# Patient Record
Sex: FEMALE | Race: WHITE | NOT HISPANIC OR LATINO | URBAN - METROPOLITAN AREA
[De-identification: names, ages, dates, MRNs, and addresses within clinical notes are randomized per-mention and may not be internally consistent; named-entity substitution may affect disease eponyms.]

---

## 2022-06-03 ENCOUNTER — WEB ENCOUNTER (OUTPATIENT)
Dept: URBAN - METROPOLITAN AREA CLINIC 113 | Facility: CLINIC | Age: 32
End: 2022-06-03

## 2022-06-03 ENCOUNTER — OFFICE VISIT (OUTPATIENT)
Dept: URBAN - METROPOLITAN AREA CLINIC 113 | Facility: CLINIC | Age: 32
End: 2022-06-03
Payer: COMMERCIAL

## 2022-06-03 ENCOUNTER — LAB OUTSIDE AN ENCOUNTER (OUTPATIENT)
Dept: URBAN - METROPOLITAN AREA CLINIC 113 | Facility: CLINIC | Age: 32
End: 2022-06-03

## 2022-06-03 VITALS
BODY MASS INDEX: 45.82 KG/M2 | HEART RATE: 81 BPM | DIASTOLIC BLOOD PRESSURE: 82 MMHG | HEIGHT: 65 IN | SYSTOLIC BLOOD PRESSURE: 123 MMHG | TEMPERATURE: 97.7 F | RESPIRATION RATE: 20 BRPM | WEIGHT: 275 LBS

## 2022-06-03 DIAGNOSIS — R19.7 DIARRHEA, UNSPECIFIED TYPE: ICD-10-CM

## 2022-06-03 DIAGNOSIS — R13.19 ESOPHAGEAL DYSPHAGIA: ICD-10-CM

## 2022-06-03 DIAGNOSIS — R10.84 GENERALIZED ABDOMINAL PAIN: ICD-10-CM

## 2022-06-03 DIAGNOSIS — K59.09 CHRONIC CONSTIPATION: ICD-10-CM

## 2022-06-03 DIAGNOSIS — R10.11 RIGHT UPPER QUADRANT ABDOMINAL PAIN: ICD-10-CM

## 2022-06-03 PROCEDURE — 99204 OFFICE O/P NEW MOD 45 MIN: CPT | Performed by: NURSE PRACTITIONER

## 2022-06-03 PROCEDURE — 99204 OFFICE O/P NEW MOD 45 MIN: CPT | Performed by: INTERNAL MEDICINE

## 2022-06-03 RX ORDER — DICYCLOMINE HYDROCHLORIDE 10 MG/1
1 TABLET CAPSULE ORAL
Qty: 60 | Refills: 3 | OUTPATIENT
Start: 2022-06-03 | End: 2022-10-01

## 2022-06-03 RX ORDER — SERTRALINE 50 MG/1
1 TABLET TABLET, FILM COATED ORAL ONCE A DAY
Status: ACTIVE | COMMUNITY

## 2022-06-03 RX ORDER — PANTOPRAZOLE SODIUM 40 MG/1
1 TABLET TABLET, DELAYED RELEASE ORAL ONCE A DAY
Qty: 30 | Refills: 5 | OUTPATIENT
Start: 2022-06-03

## 2022-06-03 RX ORDER — FLUOXETINE HYDROCHLORIDE 20 MG/1
1 TABLET TABLET ORAL ONCE A DAY
Status: ACTIVE | COMMUNITY

## 2022-06-03 NOTE — HPI-TODAY'S VISIT:
This is a 31-year-old female with a history of endometriosis, hypertension, anxiety, and a herniated disc presenting for evaluation of abdominal pain.   She reports onset of pain 6 months ago.  She has postprandial sharp, stabbing right upper quadrant pain that lasts 2 minutes.  She has intermittent abdominal pain in variable locations in her abdomen occurring at random feeling like "a kick in the stomach, sharp shooting pain, aggravating pain, and stabbing pain."  Occasionally, this is exacerbated by constipation and will persist for 1 to 2 days after a bowel movement.   At baseline, she has chronic constipation reporting 2 or 3 days without a bowel movement.  Stool softeners are ineffective and she has diarrhea associated with a single dose of MiraLAX or milk of magnesia.  She denies red blood per rectum or melena.  For the last 4 or 5 days, she has experienced diarrhea reporting 5-10 unformed or watery bowel movements per day.   She admits being treated as an inpatient and outpatient for a kidney infection a month ago.  She is required 4 different antibiot ics and was hospitalized for 3 days due to positive blood cultures requiring IV antibiotics.  She is scheduled for urological evaluation in August.  She has not had stool studies. She has a history of acid reflux associated with ingesting spicy or fried food.  It is relieved with milk which she infrequently needs as she manages symptoms with dietary modification.  She took pantoprazole in the past which was helpful.  She has dysphagia 2 or 3 times a week describing solid food lodging in the upper portion of her esophagus relieved with time or liquid.  She reports nausea for the last 5 days.  She vomited the first day only.  She denies weight loss. She states her primary care physician has not ordered tests or prescribed medication.  She has been to the emergency department at [facility] 4 times in the last 3 months and has been seen by urgent care 6 times in the last 3 months.  She was last seen in the emergency department 2 weeks ago.  Stool softeners and MiraLAX have been recommended and she has not been prescribed medication.  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

## 2022-07-12 ENCOUNTER — OFFICE VISIT (OUTPATIENT)
Dept: URBAN - METROPOLITAN AREA MEDICAL CENTER 19 | Facility: MEDICAL CENTER | Age: 32
End: 2022-07-12
Payer: COMMERCIAL

## 2022-07-12 DIAGNOSIS — K20.80 ESOPHAGITIS DISSECANS SUPERFICIALIS: ICD-10-CM

## 2022-07-12 DIAGNOSIS — R10.13 ABDOMINAL DISCOMFORT, EPIGASTRIC: ICD-10-CM

## 2022-07-12 DIAGNOSIS — K29.60 ADENOPAPILLOMATOSIS GASTRICA: ICD-10-CM

## 2022-07-12 DIAGNOSIS — R13.19 CERVICAL DYSPHAGIA: ICD-10-CM

## 2022-07-12 PROCEDURE — 43239 EGD BIOPSY SINGLE/MULTIPLE: CPT | Performed by: INTERNAL MEDICINE

## 2022-07-12 RX ORDER — DICYCLOMINE HYDROCHLORIDE 10 MG/1
1 TABLET CAPSULE ORAL
Qty: 60 | Refills: 3 | Status: ACTIVE | COMMUNITY
Start: 2022-06-03 | End: 2022-10-01

## 2022-07-12 RX ORDER — PANTOPRAZOLE SODIUM 40 MG/1
1 TABLET TABLET, DELAYED RELEASE ORAL ONCE A DAY
Qty: 30 | Refills: 5 | Status: ACTIVE | COMMUNITY
Start: 2022-06-03

## 2022-07-12 RX ORDER — SERTRALINE 50 MG/1
1 TABLET TABLET, FILM COATED ORAL ONCE A DAY
Status: ACTIVE | COMMUNITY

## 2022-07-12 RX ORDER — FLUOXETINE HYDROCHLORIDE 20 MG/1
1 TABLET TABLET ORAL ONCE A DAY
Status: ACTIVE | COMMUNITY

## 2022-08-24 ENCOUNTER — OFFICE VISIT (OUTPATIENT)
Dept: URBAN - METROPOLITAN AREA CLINIC 107 | Facility: CLINIC | Age: 32
End: 2022-08-24
Payer: COMMERCIAL

## 2022-08-24 ENCOUNTER — DASHBOARD ENCOUNTERS (OUTPATIENT)
Age: 32
End: 2022-08-24

## 2022-08-24 VITALS
BODY MASS INDEX: 46.48 KG/M2 | HEART RATE: 79 BPM | SYSTOLIC BLOOD PRESSURE: 114 MMHG | WEIGHT: 279 LBS | TEMPERATURE: 98.2 F | DIASTOLIC BLOOD PRESSURE: 80 MMHG | HEIGHT: 65 IN

## 2022-08-24 DIAGNOSIS — K59.09 CHRONIC CONSTIPATION: ICD-10-CM

## 2022-08-24 DIAGNOSIS — R10.84 GENERALIZED ABDOMINAL PAIN: ICD-10-CM

## 2022-08-24 DIAGNOSIS — R13.19 ESOPHAGEAL DYSPHAGIA: ICD-10-CM

## 2022-08-24 DIAGNOSIS — R10.11 RIGHT UPPER QUADRANT ABDOMINAL PAIN: ICD-10-CM

## 2022-08-24 DIAGNOSIS — R19.7 DIARRHEA, UNSPECIFIED TYPE: ICD-10-CM

## 2022-08-24 PROBLEM — 62315008: Status: ACTIVE | Noted: 2022-06-03

## 2022-08-24 PROBLEM — 102614006: Status: ACTIVE | Noted: 2022-06-03

## 2022-08-24 PROBLEM — 301717006: Status: ACTIVE | Noted: 2022-06-03

## 2022-08-24 PROBLEM — 236069009: Status: ACTIVE | Noted: 2022-06-04

## 2022-08-24 PROBLEM — 40890009 ESOPHAGEAL DYSPHAGIA: Status: ACTIVE | Noted: 2022-06-03

## 2022-08-24 PROCEDURE — 99213 OFFICE O/P EST LOW 20 MIN: CPT | Performed by: INTERNAL MEDICINE

## 2022-08-24 RX ORDER — LOSARTAN POTASSIUM 25 MG/1
1 TABLET TABLET ORAL ONCE A DAY
Status: ACTIVE | COMMUNITY

## 2022-08-24 RX ORDER — FLUOXETINE HYDROCHLORIDE 20 MG/1
1 TABLET TABLET ORAL ONCE A DAY
Status: ACTIVE | COMMUNITY

## 2022-08-24 RX ORDER — PANTOPRAZOLE SODIUM 40 MG/1
1 TABLET TABLET, DELAYED RELEASE ORAL ONCE A DAY
Qty: 30 | Refills: 5 | Status: ACTIVE | COMMUNITY
Start: 2022-06-03

## 2022-08-24 RX ORDER — SERTRALINE 50 MG/1
1 TABLET TABLET, FILM COATED ORAL ONCE A DAY
Status: ACTIVE | COMMUNITY

## 2022-08-24 RX ORDER — DICYCLOMINE HYDROCHLORIDE 10 MG/1
1 TABLET CAPSULE ORAL
Qty: 60 | Refills: 3 | Status: ACTIVE | COMMUNITY
Start: 2022-06-03 | End: 2022-10-01

## 2022-08-24 NOTE — HPI-TODAY'S VISIT:
31-year-old female presented for follow-up.  She does have a history of right upper quadrant abdominal pain which is intermittent in nature As well as dysphagia.  She did have an upper endoscopy performed on July 12, 2022.  She did not have any evidence of stricture or identifiable abnormalities to explain her dysphagia.  Biopsies were taken to rule out eosinophilic esophagitis.  She also had gastritis which was also biopsied. Her gastric biopsies showed mild chronic gastritis.  Her esophageal biopsies were unremarkable.  She is here today for follow-up. She still has abdominal pain before she eats.   6/3/22 This is a 31-year-old female with a history of endometriosis, hypertension, anxiety, and a herniated disc presenting for evaluation of abdominal pain.   She reports onset of pain 6 months ago.  She has postprandial sharp, stabbing right upper quadrant pain that lasts 2 minutes.  She has intermittent abdominal pain in variable locations in her abdomen occurring at random feeling like "a kick in the stomach, sharp shooting pain, aggravating pain, and stabbing pain."  Occasionally, this is exacerbated by constipation and will persist for 1 to 2 days after a bowel movement.   At baseline, she has chronic constipation reporting 2 or 3 days without a bowel movement.  Stool softeners are ineffective and she has diarrhea associated with a single dose of MiraLAX or milk of magnesia.  She denies red blood per rectum or melena.  For the last 4 or 5 days, she has experienced diarrhea reporting 5-10 unformed or watery bowel movements per day.   She admits being treated as an inpatient and outpatient for a kidney infection a month ago.  She is required 4 different antibiot ics and was hospitalized for 3 days due to positive blood cultures requiring IV antibiotics.  She is scheduled for urological evaluation in August.  She has not had stool studies. She has a history of acid reflux associated with ingesting spicy or fried food.  It is relieved with milk which she infrequently needs as she manages symptoms with dietary modification.  She took pantoprazole in the past which was helpful.  She has dysphagia 2 or 3 times a week describing solid food lodging in the upper portion of her esophagus relieved with time or liquid.  She reports nausea for the last 5 days.  She vomited the first day only.  She denies weight loss. She states her primary care physician has not ordered tests or prescribed medication.  She has been to the emergency department at [facility] 4 times in the last 3 months and has been seen by urgent care 6 times in the last 3 months.  She was last seen in the emergency department 2 weeks ago.  Stool softeners and MiraLAX have been recommended and she has not been prescribed medication.  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

## 2022-08-26 ENCOUNTER — TELEPHONE ENCOUNTER (OUTPATIENT)
Dept: URBAN - METROPOLITAN AREA CLINIC 113 | Facility: CLINIC | Age: 32
End: 2022-08-26

## 2022-09-27 ENCOUNTER — TELEPHONE ENCOUNTER (OUTPATIENT)
Dept: URBAN - METROPOLITAN AREA CLINIC 113 | Facility: CLINIC | Age: 32
End: 2022-09-27

## 2022-12-02 ENCOUNTER — OFFICE VISIT (OUTPATIENT)
Dept: URBAN - METROPOLITAN AREA CLINIC 107 | Facility: CLINIC | Age: 32
End: 2022-12-02

## 2022-12-02 NOTE — HPI-TODAY'S VISIT:
32-year-old female presents for follow-up.  She was last seen on 8/24/2022.  She has persistent abdominal pain of unclear etiology.  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  She was planned for repeat CT scan of the abdomen/pelvis and referred to Dr. Kaur for further evaluation.  She was advised to take dicyclomine 30 minutes before eating. Patient has not had the repeat CT scan performed.   8/24/22 31-year-old female presented for follow-up.  She does have a history of right upper quadrant abdominal pain which is intermittent in nature As well as dysphagia.  She did have an upper endoscopy performed on July 12, 2022.  She did not have any evidence of stricture or identifiable abnormalities to explain her dysphagia.  Biopsies were taken to rule out eosinophilic esophagitis.  She also had gastritis which was also biopsied. Her gastric biopsies showed mild chronic gastritis.  Her esophageal biopsies were unremarkable.  She is here today for follow-up. She still has abdominal pain before she eats.   6/3/22 This is a 31-year-old female with a history of endometriosis, hypertension, anxiety, and a herniated disc presenting for evaluation of abdominal pain.   She reports onset of pain 6 months ago.  She has postprandial sharp, stabbing right upper quadrant pain that lasts 2 minutes.  She has intermittent abdominal pain in variable locations in her abdomen occurring at random feeling like "a kick in the stomach, sharp shooting pain, aggravating pain, and stabbing pain."  Occasionally, this is exacerbated by constipation and will persist for 1 to 2 days after a bowel movement.   At baseline, she has chronic constipation reporting 2 or 3 days without a bowel movement.  Stool softeners are ineffective and she has diarrhea associated with a single dose of MiraLAX or milk of magnesia.  She denies red blood per rectum or melena.  For the last 4 or 5 days, she has experienced diarrhea reporting 5-10 unformed or watery bowel movements per day.   She admits being treated as an inpatient and outpatient for a kidney infection a month ago.  She is required 4 different antibiot ics and was hospitalized for 3 days due to positive blood cultures requiring IV antibiotics.  She is scheduled for urological evaluation in August.  She has not had stool studies. She has a history of acid reflux associated with ingesting spicy or fried food.  It is relieved with milk which she infrequently needs as she manages symptoms with dietary modification.  She took pantoprazole in the past which was helpful.  She has dysphagia 2 or 3 times a week describing solid food lodging in the upper portion of her esophagus relieved with time or liquid.  She reports nausea for the last 5 days.  She vomited the first day only.  She denies weight loss. She states her primary care physician has not ordered tests or prescribed medication.  She has been to the emergency department at [facility] 4 times in the last 3 months and has been seen by urgent care 6 times in the last 3 months.  She was last seen in the emergency department 2 weeks ago.  Stool softeners and MiraLAX have been recommended and she has not been prescribed medication.  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